# Patient Record
Sex: FEMALE | Race: WHITE | URBAN - NONMETROPOLITAN AREA
[De-identification: names, ages, dates, MRNs, and addresses within clinical notes are randomized per-mention and may not be internally consistent; named-entity substitution may affect disease eponyms.]

---

## 2017-01-30 ENCOUNTER — OFFICE VISIT (OUTPATIENT)
Dept: FAMILY MEDICINE | Facility: OTHER | Age: 57
End: 2017-01-30

## 2017-01-30 VITALS
BODY MASS INDEX: 41.66 KG/M2 | OXYGEN SATURATION: 98 % | WEIGHT: 244 LBS | TEMPERATURE: 98.5 F | HEIGHT: 64 IN | SYSTOLIC BLOOD PRESSURE: 150 MMHG | HEART RATE: 72 BPM | DIASTOLIC BLOOD PRESSURE: 80 MMHG | RESPIRATION RATE: 20 BRPM

## 2017-01-30 DIAGNOSIS — Z02.89 HEALTH EXAMINATION OF DEFINED SUBPOPULATION: Primary | ICD-10-CM

## 2017-01-30 PROCEDURE — 99499 UNLISTED E&M SERVICE: CPT | Performed by: NURSE PRACTITIONER

## 2017-01-30 RX ORDER — ERGOCALCIFEROL 1.25 MG/1
CAPSULE, LIQUID FILLED ORAL
COMMUNITY
Start: 2016-11-11

## 2017-01-30 RX ORDER — LEVOTHYROXINE SODIUM 100 UG/1
100 TABLET ORAL
COMMUNITY
Start: 2016-12-05

## 2017-01-30 RX ORDER — LOSARTAN POTASSIUM 25 MG/1
25 TABLET ORAL
COMMUNITY
Start: 2016-11-11

## 2017-01-30 RX ORDER — HYDROCORTISONE 10 MG/1
TABLET ORAL
COMMUNITY
Start: 2016-08-23

## 2017-01-30 RX ORDER — GLIMEPIRIDE 1 MG/1
1 TABLET ORAL
COMMUNITY
Start: 2017-01-20

## 2017-01-30 NOTE — MR AVS SNAPSHOT
"              After Visit Summary   2017    Krystyna Jackson    MRN: [id number]           Patient Information     Date Of Birth          1960        Visit Information        Provider Department      2017 1:20 PM Dorys Ren APRN CNP [facility]        Care Instructions    Your blood pressure was too high today.  Follow up with your primary care provider for a recheck of that.             Follow-ups after your visit        Who to contact     If you have questions or need follow up information about today's clinic visit or your schedule please contact [facility] directly at [phone].  Normal or non-critical lab and imaging results will be communicated to you by Spark[employer], letter or phone within 4 business days after the clinic has received the results. If you do not hear from us within 7 days, please contact the clinic through Sparkroad[employer] or phone. If you have a critical or abnormal lab result, we will notify you by phone as soon as possible.  Submit refill requests through [employer] or call your pharmacy and they will forward the refill request to us. Please allow 3 business days for your refill to be completed.          Additional Information About Your Visit        MyChart Information     [employer]Hop lets you send messages to your doctor, view your test results, renew your prescriptions, schedule appointments and more. To sign up, go to [email or web address] . Click on \"Log in\" on the left side of the screen, which will take you to the Welcome page. Then click on \"Sign up Now\" on the right side of the page.     You will be asked to enter the access code listed below, as well as some personal information. Please follow the directions to create your username and password.     Your access code is: [email or web address]  Expires: 2017  1:46 PM     Your access code will  in 90 days. If you need help or a new code, please call your [facility] or [phone].        Care " "EveryWhere ID     This is your Care EveryWhere ID. This could be used by other organizations to access your [facility] medical records  [id number]        Your Vitals Were     Pulse Temperature Respirations Height BMI (Body Mass Index) Pulse Oximetry    72 98.5  F (36.9  C) (Tympanic) 20 5' 3.5\" (1.613 m) 42.54 kg/m2 98%       Blood Pressure from Last 3 Encounters:   01/30/17 150/80    Weight from Last 3 Encounters:   01/30/17 244 lb (110.678 kg)              Today, you had the following     No orders found for display       Primary Care Provider    None Specified       No primary provider on file.        Thank you!     Thank you for choosing [facility]  for your care. Our goal is always to provide you with excellent care. Hearing back from our patients is one way we can continue to improve our services. Please take a few minutes to complete the written survey that you may receive in the mail after your visit with us. Thank you!             Your Updated Medication List - Protect others around you: Learn how to safely use, store and throw away your medicines at www.disposemymeds.org.          This list is accurate as of: 1/30/17  1:46 PM.  Always use your most recent med list.                   Brand Name Dispense Instructions for use    AMARYL 1 MG tablet   Generic drug:  glimepiride      Take 1 mg by mouth       hydrocortisone 10 MG tablet    CORTEF         levothyroxine 100 MCG tablet    SYNTHROID/LEVOTHROID     Take 100 mcg by mouth       losartan 25 MG tablet    COZAAR     Take 25 mg by mouth       order for DME          vitamin D 15679 UNIT capsule    ERGOCALCIFEROL           "

## 2017-01-30 NOTE — PROGRESS NOTES
"Chief Complaint   Patient presents with     Forms     TIMBER TRAILS EXAM       Initial /80 mmHg  Pulse 72  Temp(Src) 98.5  F (36.9  C) (Tympanic)  Resp 20  Ht 5' 3.5\" (1.613 m)  Wt 244 lb (110.678 kg)  BMI 42.54 kg/m2  SpO2 98% Estimated body mass index is 42.54 kg/(m^2) as calculated from the following:    Height as of this encounter: 5' 3.5\" (1.613 m).    Weight as of this encounter: 244 lb (110.678 kg).  BP completed using cuff size: large  ................Dom Cabral LPN,   January 30, 2017,      1:31 PM,   [facility]       SUBJECTIVE:     CC: Krystyna Jackson is an 56 year old woman who presents for Contra[facility]ect  screening     Past Medical History   Diagnosis Date     DM (diabetes mellitus) (H)      Hypothyroidism      Pituitary gland disorder (H)      HTN (hypertension)       Past Surgical History   Procedure Laterality Date     Pituitary surgery         ROS:  C: NEGATIVE for fever, chills, change in weight  I: NEGATIVE for worrisome rashes, moles or lesions  E: NEGATIVE for vision changes or irritation  ENT: NEGATIVE for ear, mouth and throat problems  R: NEGATIVE for significant cough or SOB  B: NEGATIVE for masses, tenderness or discharge  CV: NEGATIVE for chest pain, palpitations or peripheral edema  GI: NEGATIVE for nausea, abdominal pain, heartburn, or change in bowel habits  : NEGATIVE for unusual urinary or vaginal symptoms. Periods are regular.  M: NEGATIVE for significant arthralgias or myalgia  N: NEGATIVE for weakness, dizziness or paresthesias  P: NEGATIVE for changes in mood or affect    Problem list, Medication list, Allergies, and Medical/Social/Surgical histories reviewed in [facility] and updated as appropriate.  BP Readings from Last 3 Encounters:   01/30/17 150/80    Wt Readings from Last 3 Encounters:   01/30/17 244 lb (110.678 kg)                  There is no problem list on file for this patient.    Past Surgical History   Procedure Laterality Date     Pituitary " "surgery         Social History   Substance Use Topics     Smoking status: Never Smoker      Smokeless tobacco: Not on file     Alcohol Use: Not on file     History reviewed. No pertinent family history.      Current Outpatient Prescriptions   Medication Sig Dispense Refill     order for DME        glimepiride (AMARYL) 1 MG tablet Take 1 mg by mouth       levothyroxine (SYNTHROID/LEVOTHROID) 100 MCG tablet Take 100 mcg by mouth       losartan (COZAAR) 25 MG tablet Take 25 mg by mouth       vitamin D (ERGOCALCIFEROL) 66311 UNIT capsule        hydrocortisone (CORTEF) 10 MG tablet        Allergies not on file  OBJECTIVE:     /80 mmHg  Pulse 72  Temp(Src) 98.5  F (36.9  C) (Tympanic)  Resp 20  Ht 5' 3.5\" (1.613 m)  Wt 244 lb (110.678 kg)  BMI 42.54 kg/m2  SpO2 98%  EXAM:  GENERAL: alert, no distress and obese  NECK: no adenopathy, no asymmetry, masses, or scars and thyroid normal to palpation  RESP: lungs clear to auscultation - no rales, rhonchi or wheezes  CV: regular rate and rhythm, normal S1 S2, no S3 or S4, no murmur, click or rub, no peripheral edema and peripheral pulses strong  ABDOMEN: soft, nontender, no hepatosplenomegaly, no masses and bowel sounds normal  MS: no gross musculoskeletal defects noted, no edema    ASSESSMENT/PLAN:     1. Health examination of defined subpopulation  - C VOLUNTEER  SCREENING PROGRAM        YOAV Pool [facility]  "

## 2017-01-30 NOTE — PATIENT INSTRUCTIONS
Your blood pressure was too high today.  Follow up with your primary care provider for a recheck of that.